# Patient Record
Sex: FEMALE | Race: WHITE
[De-identification: names, ages, dates, MRNs, and addresses within clinical notes are randomized per-mention and may not be internally consistent; named-entity substitution may affect disease eponyms.]

---

## 2021-05-01 ENCOUNTER — HOSPITAL ENCOUNTER (EMERGENCY)
Dept: HOSPITAL 61 - ER | Age: 36
Discharge: HOME | End: 2021-05-01
Payer: SELF-PAY

## 2021-05-01 VITALS — BODY MASS INDEX: 21.38 KG/M2 | WEIGHT: 116.18 LBS | HEIGHT: 62 IN

## 2021-05-01 VITALS — DIASTOLIC BLOOD PRESSURE: 72 MMHG | SYSTOLIC BLOOD PRESSURE: 112 MMHG

## 2021-05-01 DIAGNOSIS — F10.20: ICD-10-CM

## 2021-05-01 DIAGNOSIS — F11.23: Primary | ICD-10-CM
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
## 2021-05-01 DIAGNOSIS — R07.89: ICD-10-CM

## 2021-05-01 LAB
ANION GAP SERPL CALC-SCNC: 10 MMOL/L (ref 6–14)
BASOPHILS # BLD AUTO: 0.1 X10^3/UL (ref 0–0.2)
BASOPHILS NFR BLD: 1 % (ref 0–3)
BUN SERPL-MCNC: 12 MG/DL (ref 7–20)
CALCIUM SERPL-MCNC: 9 MG/DL (ref 8.5–10.1)
CHLORIDE SERPL-SCNC: 108 MMOL/L (ref 98–107)
CO2 SERPL-SCNC: 25 MMOL/L (ref 21–32)
CREAT SERPL-MCNC: 0.7 MG/DL (ref 0.6–1)
EOSINOPHIL NFR BLD: 0 X10^3/UL (ref 0–0.7)
EOSINOPHIL NFR BLD: 1 % (ref 0–3)
ERYTHROCYTE [DISTWIDTH] IN BLOOD BY AUTOMATED COUNT: 13.5 % (ref 11.5–14.5)
GFR SERPLBLD BASED ON 1.73 SQ M-ARVRAT: 95.8 ML/MIN
GLUCOSE SERPL-MCNC: 60 MG/DL (ref 70–99)
HCT VFR BLD CALC: 40.1 % (ref 36–47)
HGB BLD-MCNC: 13.7 G/DL (ref 12–15.5)
LYMPHOCYTES # BLD: 1.5 X10^3/UL (ref 1–4.8)
LYMPHOCYTES NFR BLD AUTO: 16 % (ref 24–48)
MCH RBC QN AUTO: 31 PG (ref 25–35)
MCHC RBC AUTO-ENTMCNC: 34 G/DL (ref 31–37)
MCV RBC AUTO: 91 FL (ref 79–100)
MONO #: 1.3 X10^3/UL (ref 0–1.1)
MONOCYTES NFR BLD: 14 % (ref 0–9)
NEUT #: 6.5 X10^3/UL (ref 1.8–7.7)
NEUTROPHILS NFR BLD AUTO: 69 % (ref 31–73)
PLATELET # BLD AUTO: 262 X10^3/UL (ref 140–400)
POTASSIUM SERPL-SCNC: 3.8 MMOL/L (ref 3.5–5.1)
RBC # BLD AUTO: 4.39 X10^6/UL (ref 3.5–5.4)
SODIUM SERPL-SCNC: 143 MMOL/L (ref 136–145)
WBC # BLD AUTO: 9.5 X10^3/UL (ref 4–11)

## 2021-05-01 PROCEDURE — 99285 EMERGENCY DEPT VISIT HI MDM: CPT

## 2021-05-01 PROCEDURE — 71045 X-RAY EXAM CHEST 1 VIEW: CPT

## 2021-05-01 PROCEDURE — 96372 THER/PROPH/DIAG INJ SC/IM: CPT

## 2021-05-01 PROCEDURE — 96374 THER/PROPH/DIAG INJ IV PUSH: CPT

## 2021-05-01 PROCEDURE — 82962 GLUCOSE BLOOD TEST: CPT

## 2021-05-01 PROCEDURE — 93005 ELECTROCARDIOGRAM TRACING: CPT

## 2021-05-01 PROCEDURE — 96375 TX/PRO/DX INJ NEW DRUG ADDON: CPT

## 2021-05-01 PROCEDURE — 84484 ASSAY OF TROPONIN QUANT: CPT

## 2021-05-01 PROCEDURE — 85025 COMPLETE CBC W/AUTO DIFF WBC: CPT

## 2021-05-01 PROCEDURE — 80048 BASIC METABOLIC PNL TOTAL CA: CPT

## 2021-05-01 PROCEDURE — 36415 COLL VENOUS BLD VENIPUNCTURE: CPT

## 2021-05-01 NOTE — ED.ADGEN
Past Medical History


Past Medical History:  No Pertinent History


Past Surgical History:  No Surgical History


Smoking Status:  Never Smoker


Alcohol Use:  Heavy





General Adult


EDM:


Chief Complaint:  OVERDOSE





HPI:


HPI:


Patient is a 36-year-old female who presents to the emergency room complaining 

of paresthesias all over her body, shortness of breath, chest pain.  Patient has

an addiction to pain medication.  She told her fianc that she took several pain

pills and that she was having some chest pain.  He drove to the pharmacy and got

Narcan.  He gave the Narcan to her and after that she started having these 

paresthesias and severe chest tightness.  Patient also took several THC Gummies 

earlier this evening.  Patient just states that her entire body burns.  She has 

no other medical history.





Review of Systems:


Review of Systems:


Complete ROS is negative unless otherwise documented in HPI





Current Medications:





Current Medications








 Medications


  (Trade)  Dose


 Ordered  Sig/Philipp  Start Time


 Stop Time Status Last Admin


Dose Admin


 


 Clonidine HCl


  (Catapres)  0.1 mg  1X  ONCE  5/1/21 03:30


 5/1/21 03:31 DC 5/1/21 03:31


0.1 MG


 


 Lorazepam


  (Ativan Inj)  1 mg  1X  ONCE  5/1/21 03:30


 5/1/21 03:31 DC 5/1/21 03:29


1 MG


 


 Ondansetron HCl


  (Zofran)  4 mg  1X  ONCE  5/1/21 03:30


 5/1/21 03:31 DC 5/1/21 03:29


4 MG











Allergies:


Allergies:





Allergies








Coded Allergies Type Severity Reaction Last Updated Verified


 


  No Known Drug Allergies    5/1/21 No











Physical Exam:


PE:


General: Awake, alert, mild distress. Well Nourished, well hydrated. 


HEENT: Atraumatic, EOMI, PERRL, airway patent, moist oral mucosa


Neck: Supple, trachea midline


Respiratory: CTA bilaterally, normal effort, no wheezing/crackles


CV: Tachycardia, no murmur, cap refill <2


GI: Soft, nondistended, nontender, no masses


MSK: No obvious deformities


Skin: Warm, dry, intact


Neuro: A&O x3, speech NL, sensory and motor grossly intact, no focal deficits


Psych: Anxious, not suicidal or homicidal





Current Patient Data:


Labs:





                                Laboratory Tests








Test


 5/1/21


03:20 5/1/21


08:19


 


White Blood Count


 9.5 x10^3/uL


(4.0-11.0) 





 


Red Blood Count


 4.39 x10^6/uL


(3.50-5.40) 





 


Hemoglobin


 13.7 g/dL


(12.0-15.5) 





 


Hematocrit


 40.1 %


(36.0-47.0) 





 


Mean Corpuscular Volume


 91 fL ()


 





 


Mean Corpuscular Hemoglobin 31 pg (25-35)   


 


Mean Corpuscular Hemoglobin


Concent 34 g/dL


(31-37) 





 


Red Cell Distribution Width


 13.5 %


(11.5-14.5) 





 


Platelet Count


 262 x10^3/uL


(140-400) 





 


Neutrophils (%) (Auto) 69 % (31-73)   


 


Lymphocytes (%) (Auto) 16 % (24-48)  L 


 


Monocytes (%) (Auto) 14 % (0-9)  H 


 


Eosinophils (%) (Auto) 1 % (0-3)   


 


Basophils (%) (Auto) 1 % (0-3)   


 


Neutrophils # (Auto)


 6.5 x10^3/uL


(1.8-7.7) 





 


Lymphocytes # (Auto)


 1.5 x10^3/uL


(1.0-4.8) 





 


Monocytes # (Auto)


 1.3 x10^3/uL


(0.0-1.1)  H 





 


Eosinophils # (Auto)


 0.0 x10^3/uL


(0.0-0.7) 





 


Basophils # (Auto)


 0.1 x10^3/uL


(0.0-0.2) 





 


Sodium Level


 143 mmol/L


(136-145) 





 


Potassium Level


 3.8 mmol/L


(3.5-5.1) 





 


Chloride Level


 108 mmol/L


()  H 





 


Carbon Dioxide Level


 25 mmol/L


(21-32) 





 


Anion Gap 10 (6-14)   


 


Blood Urea Nitrogen


 12 mg/dL


(7-20) 





 


Creatinine


 0.7 mg/dL


(0.6-1.0) 





 


Estimated GFR


(Cockcroft-Gault) 95.8  


 





 


Glucose Level


 60 mg/dL


(70-99)  L 





 


Calcium Level


 9.0 mg/dL


(8.5-10.1) 





 


Troponin I Quantitative


 < 0.017 ng/mL


(0.000-0.055) 





 


Glucose (Fingerstick)


 


 92 mg/dL


(70-99)





                                Laboratory Tests


5/1/21 03:20








                                Laboratory Tests


5/1/21 03:20











Vital Signs:





                                   Vital Signs








  Date Time  Temp Pulse Resp B/P (MAP) Pulse Ox O2 Delivery O2 Flow Rate FiO2


 


5/1/21 07:57  70 16 112/72 (85) 98 Room Air  


 


5/1/21 02:15 97.9       





 97.9       











EKG:


EKG:


[]





Heart Score:


C/O Chest Pain:  N/A


Risk Factors:


Risk Factors:  DM, Current or recent (<one month) smoker, HTN, HLP, family 

history of CAD, obesity.


Risk Scores:


Score 0 - 3:  2.5% MACE over next 6 weeks - Discharge Home


Score 4 - 6:  20.3% MACE over next 6 weeks - Admit for Clinical Observation


Score 7 - 10:  72.7% MACE over next 6 weeks - Early Invasive Strategies





Radiology/Procedures:


Radiology/Procedures:


[]





Course & Med Decision Making:


Course & Med Decision Making


Pertinent Labs and Imaging studies reviewed. (See chart for details)





Patient is a 36-year-old previous healthy female presents to the emergency room 

complaining of severe paresthesias and chest pain.  Patient's chest pain did 

start prior to her getting Narcan but got much worse after she got Narcan.  It 

is likely that patient has some opiate withdrawal due to the Narcan causing her 

severe symptoms.  She will be given clonidine and Ativan here in the emergency 

room.  Work-up was ordered to evaluate chest pain and is normal.  Patient was 

discussed with Dr. Sweeney the oncoming physician who will assume care.  At 

checkout to reassessment was pending.








Assumed care of this patient at 6 AM, patient was sleeping, vital signs are sta

ble.  Reevaluate patient at 8:20 AM, patient was awake alert oriented, denies 

suicidal ideation.  Patient blood sugar was rechecked and it was 92.  Patient 

felt much better, patient will be discharged home.  Patient called her fianc to

 come and .





Dragon Disclaimer:


Dragon Disclaimer:


This electronic medical record was generated, in whole or in part, using a voice

 recognition dictation system.





Departure


Departure


Impression:  


   Primary Impression:  


   Opiate withdrawal


   Additional Impression:  


   Chest pain


Disposition:  01 HOME / SELF CARE / HOMELESS


Condition:  STABLE


Referrals:  


GUERA REINA MD


Please follow up with [facility] this week.





[address], [address]


[place] [address]





Phone number: [phone]


Patient Instructions:  Narcotic Withdrawal





Additional Instructions:  


Thank you for visiting our Emergency Department. We appreciate you trusting us 

with your care. If any additional problems come up don't hesitate to return to 

visit us. Please follow up with your primary care provider so they can plan 

additional care if needed and know about the problem that you had. If symptoms 

worsen come back to the Emergency Department. Any concerning symptoms that start

 such as chest pain, shortness of air, weakness or numbness on one side of the 

body, running high fevers or any other concerning symptoms return to the ER.





Problem Qualifiers











PRATIMA CAM MD             May 1, 2021 06:47


DOYLE SWEENEY DO                 May 1, 2021 08:24

## 2021-05-01 NOTE — EKG
[facility] acute hospital

              [address] [place] [address]

Test Date:    2021               Test Time:    02:29:28

Pat Name:     KALPESH DAVIS            Department:   

Patient ID:   [id number]           Room:          

Gender:       F                        Technician:   

:          [id number]               Requested By: PRATIMA CAM

Order Number: [id number]           Reading MD:     

                                 Measurements

Intervals                              Axis          

Rate:         103                      P:            55

WY:           118                      QRS:          27

QRSD:         90                       T:            43

QT:           344                                    

QTc:          453                                    

                           Interpretive Statements

SINUS TACHYCARDIA

OTHERWISE NORMAL ECG

RI6.02

No previous ECG available for comparison

## 2021-05-01 NOTE — RAD
Single view chest dated 5/1/2021.



No comparison available.



INDICATION: Chest pain.



FINDINGS:



Single upright portable exam performed. Heart and mediastinal contours are within normal limits. Lung
s are somewhat hyperinflated but otherwise clear. No consolidation or pleural effusion. No pneumothor
ax.



IMPRESSION:



No acute radiographic abnormality.



Electronically signed by: Domenico Lagunas MD (5/1/2021 4:12 AM) SAM